# Patient Record
Sex: MALE | Race: WHITE | Employment: FULL TIME | URBAN - METROPOLITAN AREA
[De-identification: names, ages, dates, MRNs, and addresses within clinical notes are randomized per-mention and may not be internally consistent; named-entity substitution may affect disease eponyms.]

---

## 2022-05-26 NOTE — PROGRESS NOTES
[facility]   Cardiac Consultation    Referring Provider:  No primary care provider on file. No chief complaint on file. History of Present Illness:   Arabella Mendoza is here as a new patient. He was referred by MARISA Booth for left bundle block branch. He also needs cardiac clearance for his life insurance policy. Today ***    Past Medical History:   has no past medical history on file. Surgical History:   has no past surgical history on file. Social History:        Family History:  family history is not on file. Home Medications:  Prior to Admission medications    Not on File        Allergies:  Patient has no allergy information on record. Review of Systems:   · Constitutional: there has been no unanticipated weight loss. There's been no change in energy level, sleep pattern, or activity level. · Eyes: No visual changes or diplopia. No scleral icterus. · ENT: No Headaches, hearing loss or vertigo. No mouth sores or sore throat. · Cardiovascular: Reviewed in HPI  · Respiratory: No cough or wheezing, no sputum production. No hematemesis. · Gastrointestinal: No abdominal pain, appetite loss, blood in stools. No change in bowel or bladder habits. · Genitourinary: No dysuria, trouble voiding, or hematuria. · Musculoskeletal:  No gait disturbance, weakness or joint complaints. · Integumentary: No rash or pruritis. · Neurological: No headache, diplopia, change in muscle strength, numbness or tingling. No change in gait, balance, coordination, mood, affect, memory, mentation, behavior. · Psychiatric: No anxiety, no depression. · Endocrine: No malaise, fatigue or temperature intolerance. No excessive thirst, fluid intake, or urination. No tremor. · Hematologic/Lymphatic: No abnormal bruising or bleeding, blood clots or swollen lymph nodes. · Allergic/Immunologic: No nasal congestion or hives.     Physical Examination:    There were no vitals filed for this visit. Constitutional and General Appearance: NAD   Respiratory:  · Normal excursion and expansion without use of accessory muscles  · Resp Auscultation: Normal breath sounds without dullness  Cardiovascular:  · The apical impulses not displaced  · Heart tones are crisp and normal  · Cervical veins are not engorged  · The carotid upstroke is normal in amplitude and contour without delay or bruit  · Normal S1S2, No S3, No Murmur  · Peripheral pulses are symmetrical and full  · There is no clubbing, cyanosis of the extremities. · No edema  · Femoral Arteries: 2+ and equal  · Pedal Pulses: 2+ and equal   Abdomen:  · No masses or tenderness  · Liver/Spleen: No Abnormalities Noted  Neurological/Psychiatric:  · Alert and oriented in all spheres  · Moves all extremities well  · Exhibits normal gait balance and coordination  · No abnormalities of mood, affect, memory, mentation, or behavior are noted      Assessment:     No diagnosis found. Plan:  ***  Cardiac medications reviewed including indications and pertinent side effects. Medication list updated at this visit. Check blood pressure and heart rate at home a few times per week- keep a log with dates and times and bring to office visit   Regular exercise and following a healthy diet encouraged   Follow up with me ***      Thank you for allowing me to participate in the care of this individual.      Adriana Reyes.  Liz Crystal M.D., Alejandro Lockhart

## 2022-05-26 NOTE — PROGRESS NOTES
[address]   Cardiac Consultation    Referring Provider:  No primary care provider on file. Chief Complaint   Patient presents with    New Patient     reffered for abnormal EKG       Tim Olmedo   1980     History of Present Illness:   Tim Olmedo is a 38 y.o. male who is here today as a NEW patient consult for cardiology, referred by referred by MARISA Fay for an abnormal EKG showing left bundle block branch. He also is here for cardiac assessment for his life insurance policy. Today he states he is a former smoker, quit 20 years ago. He has a family story of heart diease in his father but he is not sure of any details. He states he was having an EKG due to updating his insurance policy. No prior EKG's. He states he has mary feeling well, no chest pain or SOB. He stays active walking at work and also at home. He works for [employer] and walks often at work. He states he is able to walk  > one mile and up > flight stairs with no symptoms or functional limitations. He is busy caring for his 3year old. Patient currently denies any weight gain, edema, palpitations, chest pain, shortness of breath, dizziness, and syncope. Past Medical History:   has no past medical history on file. Surgical History:   has no past surgical history on file. Social History:   reports that he quit smoking about 22 years ago. He has never used smokeless tobacco. He reports current alcohol use. He reports that he does not use drugs. Family History:  Family history of heart diease      Home Medications:  Prior to Admission medications    Medication Sig Start Date End Date Taking? Authorizing Provider   MULTIPLE VITAMIN PO Take by mouth daily   Yes Historical Provider, MD   Cetirizine HCl (ZYRTEC ALLERGY PO) Take by mouth Pt takes when needed   Yes Historical Provider, MD        Allergies:  Patient has no known allergies.      Review of Systems:   · Constitutional: there has been no unanticipated weight loss. There's been no change in energy level, sleep pattern, or activity level. · Eyes: No visual changes or diplopia. No scleral icterus. · ENT: No Headaches, hearing loss or vertigo. No mouth sores or sore throat. · Cardiovascular: Reviewed in HPI  · Respiratory: No cough or wheezing, no sputum production. No hematemesis. · Gastrointestinal: No abdominal pain, appetite loss, blood in stools. No change in bowel or bladder habits. · Genitourinary: No dysuria, trouble voiding, or hematuria. · Musculoskeletal:  No gait disturbance, weakness or joint complaints. · Integumentary: No rash or pruritis. · Neurological: No headache, diplopia, change in muscle strength, numbness or tingling. No change in gait, balance, coordination, mood, affect, memory, mentation, behavior. · Psychiatric: No anxiety, no depression. · Endocrine: No malaise, fatigue or temperature intolerance. No excessive thirst, fluid intake, or urination. No tremor. · Hematologic/Lymphatic: No abnormal bruising or bleeding, blood clots or swollen lymph nodes. · Allergic/Immunologic: No nasal congestion or hives. Physical Examination:    Vitals:    05/31/22 0838   BP: 102/74   Pulse: 87   SpO2: 97%        Constitutional and General Appearance: NAD   Respiratory:  · Normal excursion and expansion without use of accessory muscles  · Resp Auscultation: Normal breath sounds without dullness  Cardiovascular:  · The apical impulses not displaced  · Heart tones are crisp and normal  · Cervical veins are not engorged  · The carotid upstroke is normal in amplitude and contour without delay or bruit  · Normal S1S2, No S3, No Murmur  · Peripheral pulses are symmetrical and full  · There is no clubbing, cyanosis of the extremities.   · No edema  · Femoral Arteries: 2+ and equal  · Pedal Pulses: 2+ and equal   Abdomen:  · No masses or tenderness  · Liver/Spleen: No Abnormalities Noted  Neurological/Psychiatric:  · Alert and oriented in all spheres  · Moves all extremities well  · Exhibits normal gait balance and coordination  · No abnormalities of mood, affect, memory, mentation, or behavior are noted      Assessment:   Abnormal EKG  Left BBB - asymptomatic. No prior EKG  Family history of heart diease - unknown details   Former smoker - continued cessation advised   Cardiac risk assessment for insurance policy     Plan:  Echocardiogram    Discussed a CT calcium score and stress test - patiet would like to will hold of for now, pending echo results. As he is asymptomatic, this is reasonable. Find out what your family history of heart diease is   Cardiac medications discussed including indications and pertinent side effects. Will defer at this time. Check blood pressure and heart rate at home a few times per week- keep a log with dates and times and bring to office visit   Regular exercise and following a healthy diet encouraged   Follow up with me in one month     Scribe's attestation: This note was scribed in the presence of Dr. Ivana Badillo M.D. By Rashida Soriano RN    The scribes documentation has been prepared under my direction and personally reviewed by me in its entirety. I confirm that the note above accurately reflects all work, treatment, procedures, and medical decision making performed by me. Dr. Ivana Badillo MD    Thank you for allowing me to participate in the care of this individual.      Luz Maria Ricardo.  Jyothi Willard M.D., Suzan Lim

## 2022-05-31 ENCOUNTER — OFFICE VISIT (OUTPATIENT)
Dept: CARDIOLOGY CLINIC | Age: 42
End: 2022-05-31
Payer: COMMERCIAL

## 2022-05-31 VITALS
DIASTOLIC BLOOD PRESSURE: 74 MMHG | BODY MASS INDEX: 25.66 KG/M2 | HEIGHT: 67 IN | OXYGEN SATURATION: 97 % | WEIGHT: 163.5 LBS | SYSTOLIC BLOOD PRESSURE: 102 MMHG | HEART RATE: 87 BPM

## 2022-05-31 DIAGNOSIS — I44.7 LEFT BUNDLE BRANCH BLOCK: Primary | ICD-10-CM

## 2022-05-31 DIAGNOSIS — R94.31 ABNORMAL EKG: ICD-10-CM

## 2022-05-31 PROCEDURE — G8427 DOCREV CUR MEDS BY ELIG CLIN: HCPCS | Performed by: INTERNAL MEDICINE

## 2022-05-31 PROCEDURE — 99203 OFFICE O/P NEW LOW 30 MIN: CPT | Performed by: INTERNAL MEDICINE

## 2022-05-31 PROCEDURE — G8419 CALC BMI OUT NRM PARAM NOF/U: HCPCS | Performed by: INTERNAL MEDICINE

## 2022-05-31 NOTE — PATIENT INSTRUCTIONS
Plan:  Recommend an echocardiogram which is an ultrasound of your heart to evaluate heart function, structures and valves. Discussed a CT calcium score and stress test- will hold of for now   Find out what your family history of heart diease is   Cardiac medications reviewed including indications and pertinent side effects. Medication list updated at this visit. Check blood pressure and heart rate at home a few times per week- keep a log with dates and times and bring to office visit   Regular exercise and following a healthy diet encouraged   Follow up with me in one month   Your provider has ordered testing for further evaluation. An order/prescription has been included in your paper work.  To schedule outpatient testing, contact Central Scheduling by calling [address] [place], [place] [address] ([phone]).

## 2022-06-08 ENCOUNTER — TELEPHONE (OUTPATIENT)
Dept: CARDIOLOGY CLINIC | Age: 42
End: 2022-06-08

## 2022-06-08 ENCOUNTER — HOSPITAL ENCOUNTER (OUTPATIENT)
Dept: CARDIOLOGY | Age: 42
Discharge: HOME | End: 2022-06-08
Payer: COMMERCIAL

## 2022-06-08 DIAGNOSIS — R94.31 ABNORMAL EKG: ICD-10-CM

## 2022-06-08 DIAGNOSIS — I44.7 LEFT BUNDLE BRANCH BLOCK: ICD-10-CM

## 2022-06-08 LAB
LV EF: 53 %
LVEF MODALITY: NORMAL

## 2022-06-08 PROCEDURE — 93306 TTE W/DOPPLER COMPLETE: CPT

## 2022-06-08 NOTE — TELEPHONE ENCOUNTER
Called and spoke with pt. Relayed echo results to him. Pt verbalized understanding of results given.

## 2022-06-08 NOTE — TELEPHONE ENCOUNTER
----- Message from Dayo Moe MD sent at 6/8/2022  1:22 PM EDT -----  Call  Echo looks good  Heart fxn normal

## 2022-06-24 NOTE — PROGRESS NOTES
[address]   Cardiac Consultation    Referring Provider:  Fabi David     Chief Complaint   Patient presents with    Follow-up    Other     LBBB      Tonya Wallis   1980     History of Present Illness:   Tonya Wallis is a 38 y.o. male who is here today for follow up and to review testing. He was initially seen as a new patient at the request of Fabi David for an abnormal EKG showing left bundle block branch. He also is here for cardiac assessment for his life insurance policy. He is a former smoker- quit 20 years ago. He states he is able to walk  > one mile and up > flight stairs with no symptoms or functional limitations. An echocardiogram from 6/8/2022 showed an EF of 50-55%. Father and grandfather have a fib. Today he states he has been feeling well since his last visit. He remains active without symptoms or functional limitations. Patient currently denies any weight gain, edema, palpitations, chest pain, shortness of breath, dizziness, and syncope. Past Medical History:   has no past medical history on file. Surgical History:   has no past surgical history on file. Social History:   reports that he quit smoking about 22 years ago. He has never used smokeless tobacco. He reports current alcohol use. He reports that he does not use drugs. Family History:  Family history of heart diease      Home Medications:  Prior to Admission medications    Medication Sig Start Date End Date Taking? Authorizing Provider   MULTIPLE VITAMIN PO Take by mouth daily   Yes Historical Provider, MD   Cetirizine HCl (ZYRTEC ALLERGY PO) Take by mouth Pt takes when needed   Yes Historical Provider, MD        Allergies:  Patient has no known allergies. Review of Systems:   · Constitutional: there has been no unanticipated weight loss. There's been no change in energy level, sleep pattern, or activity level. · Eyes: No visual changes or diplopia. No scleral icterus.   · ENT: No Headaches, hearing loss or vertigo. No mouth sores or sore throat. · Cardiovascular: Reviewed in HPI  · Respiratory: No cough or wheezing, no sputum production. No hematemesis. · Gastrointestinal: No abdominal pain, appetite loss, blood in stools. No change in bowel or bladder habits. · Genitourinary: No dysuria, trouble voiding, or hematuria. · Musculoskeletal:  No gait disturbance, weakness or joint complaints. · Integumentary: No rash or pruritis. · Neurological: No headache, diplopia, change in muscle strength, numbness or tingling. No change in gait, balance, coordination, mood, affect, memory, mentation, behavior. · Psychiatric: No anxiety, no depression. · Endocrine: No malaise, fatigue or temperature intolerance. No excessive thirst, fluid intake, or urination. No tremor. · Hematologic/Lymphatic: No abnormal bruising or bleeding, blood clots or swollen lymph nodes. · Allergic/Immunologic: No nasal congestion or hives. Physical Examination:    Vitals:    06/27/22 0844   BP: 106/74   Pulse: 96   SpO2: 98%        Constitutional and General Appearance: NAD   Respiratory:  · Normal excursion and expansion without use of accessory muscles  · Resp Auscultation: Normal breath sounds without dullness  Cardiovascular:  · The apical impulses not displaced  · Heart tones are crisp and normal  · Cervical veins are not engorged  · The carotid upstroke is normal in amplitude and contour without delay or bruit  · Normal S1S2, No S3, No Murmur  · Peripheral pulses are symmetrical and full  · There is no clubbing, cyanosis of the extremities.   · No edema  · Femoral Arteries: 2+ and equal  · Pedal Pulses: 2+ and equal   Abdomen:  · No masses or tenderness  · Liver/Spleen: No Abnormalities Noted  Neurological/Psychiatric:  · Alert and oriented in all spheres  · Moves all extremities well  · Exhibits normal gait balance and coordination  · No abnormalities of mood, affect, memory, mentation, or behavior are noted      Echocardiogram 6/8/2022   Summary   -- Low normal systolic function with an estimated ejection fraction of   50-55%. Abnormal (paradoxical) septal motion is present likely due to left   bundle branch block. No gross regional wall motion abnormalities are seen. Normal left ventricular diastolic filling pressure. -- MIld pulmonic regurgitation. Inadequate tricuspid regurgitation to estimate systolic pulmonary artery   pressure. Assessment:   Abnormal EKG - reviewed w/ pt  Left BBB - Asymptomatic. Stable. Low risk. No further cardiac testing at this time. No family history of CAD or sudden cardiac death  Former smoker - continued cessation advised   MIld pulmonic regurgitation - asymptomatic. stable    Plan:  Follow up with Rowan Sebastien on a regular basis   Check blood pressure and heart rate at home a few times per week- keep a log with dates and times and bring to office visit   Regular exercise and following a healthy diet encouraged   Follow up with me as needed     Scribe's attestation: This note was scribed in the presence of Dr. Moisés Horton M.D. By Cleone Bamberger RN    The scribes documentation has been prepared under my direction and personally reviewed by me in its entirety. I confirm that the note above accurately reflects all work, treatment, procedures, and medical decision making performed by me. Dr. Moisés Horton MD    Thank you for allowing me to participate in the care of this individual.      Charles Nicolas.  Anastasia Bray M.D., Sagar Putnam

## 2022-06-27 ENCOUNTER — OFFICE VISIT (OUTPATIENT)
Dept: CARDIOLOGY CLINIC | Age: 42
End: 2022-06-27
Payer: COMMERCIAL

## 2022-06-27 VITALS
OXYGEN SATURATION: 98 % | WEIGHT: 165 LBS | HEIGHT: 67 IN | BODY MASS INDEX: 25.9 KG/M2 | SYSTOLIC BLOOD PRESSURE: 106 MMHG | DIASTOLIC BLOOD PRESSURE: 74 MMHG | HEART RATE: 96 BPM

## 2022-06-27 DIAGNOSIS — I44.7 LBBB (LEFT BUNDLE BRANCH BLOCK): ICD-10-CM

## 2022-06-27 DIAGNOSIS — R94.31 ABNORMAL EKG: Primary | ICD-10-CM

## 2022-06-27 PROCEDURE — G8419 CALC BMI OUT NRM PARAM NOF/U: HCPCS | Performed by: INTERNAL MEDICINE

## 2022-06-27 PROCEDURE — 1036F TOBACCO NON-USER: CPT | Performed by: INTERNAL MEDICINE

## 2022-06-27 PROCEDURE — 99214 OFFICE O/P EST MOD 30 MIN: CPT | Performed by: INTERNAL MEDICINE

## 2022-06-27 PROCEDURE — G8427 DOCREV CUR MEDS BY ELIG CLIN: HCPCS | Performed by: INTERNAL MEDICINE

## 2022-06-27 NOTE — PATIENT INSTRUCTIONS
Plan:  Follow up with Belgica Horta on a regular basis   Check blood pressure and heart rate at home a few times per week- keep a log with dates and times and bring to office visit   Regular exercise and following a healthy diet encouraged   Follow up with me as needed

## 2024-11-10 ENCOUNTER — HOSPITAL ENCOUNTER (EMERGENCY)
Age: 44
Discharge: HOME | End: 2024-11-10
Attending: EMERGENCY MEDICINE
Payer: COMMERCIAL

## 2024-11-10 ENCOUNTER — APPOINTMENT (OUTPATIENT)
Dept: GENERAL RADIOLOGY | Age: 44
End: 2024-11-10
Payer: COMMERCIAL

## 2024-11-10 VITALS
BODY MASS INDEX: 28.25 KG/M2 | HEART RATE: 91 BPM | HEIGHT: 67 IN | DIASTOLIC BLOOD PRESSURE: 74 MMHG | OXYGEN SATURATION: 96 % | WEIGHT: 180 LBS | TEMPERATURE: 98.6 F | SYSTOLIC BLOOD PRESSURE: 135 MMHG | RESPIRATION RATE: 16 BRPM

## 2024-11-10 DIAGNOSIS — S43.004A DISLOCATION OF RIGHT SHOULDER JOINT, INITIAL ENCOUNTER: Primary | ICD-10-CM

## 2024-11-10 PROCEDURE — 23650 CLTX SHO DSLC W/MNPJ WO ANES: CPT

## 2024-11-10 PROCEDURE — 73030 X-RAY EXAM OF SHOULDER: CPT

## 2024-11-10 PROCEDURE — 99283 EMERGENCY DEPT VISIT LOW MDM: CPT

## 2024-11-10 RX ORDER — CYCLOBENZAPRINE HCL 5 MG
5 TABLET ORAL 3 TIMES DAILY PRN
Qty: 30 TABLET | Refills: 0 | Status: SHIPPED | OUTPATIENT
Start: 2024-11-10 | End: 2024-11-20

## 2024-11-10 RX ORDER — IBUPROFEN 800 MG/1
800 TABLET, FILM COATED ORAL EVERY 8 HOURS PRN
Qty: 30 TABLET | Refills: 0 | Status: SHIPPED | OUTPATIENT
Start: 2024-11-10

## 2024-11-10 ASSESSMENT — PAIN SCALES - GENERAL: PAINLEVEL_OUTOF10: 7

## 2024-11-10 ASSESSMENT — PAIN - FUNCTIONAL ASSESSMENT: PAIN_FUNCTIONAL_ASSESSMENT: 0-10

## 2024-11-11 ENCOUNTER — TELEPHONE (OUTPATIENT)
Dept: ORTHOPEDIC SURGERY | Age: 44
End: 2024-11-11

## 2024-11-11 SDOH — HEALTH STABILITY: PHYSICAL HEALTH: ON AVERAGE, HOW MANY MINUTES DO YOU ENGAGE IN EXERCISE AT THIS LEVEL?: 120 MIN

## 2024-11-11 SDOH — HEALTH STABILITY: PHYSICAL HEALTH: ON AVERAGE, HOW MANY DAYS PER WEEK DO YOU ENGAGE IN MODERATE TO STRENUOUS EXERCISE (LIKE A BRISK WALK)?: 5 DAYS

## 2024-11-11 NOTE — ED PROVIDER NOTES
EMERGENCY DEPARTMENT ENCOUNTER     [facility]  ED     Pt Name: Sebastian Zambrano   MRN: [id number]   Birthdate 1980   Date of evaluation: 11/10/2024   Provider: Farooq Linda MD   PCP: Donna Batista APRN - NP   Note Started: 10:33 PM EST 11/10/24     CHIEF COMPLAINT     Chief Complaint   Patient presents with    Fall     Slipped and fell down 7 steps at 8pm. C/o R shoulder pain. Noted deformity. No OTC meds taken        HISTORY OF PRESENT ILLNESS:  History from : Patient   Limitations to history : None     Sebastian Zambrano is a 44 y.o. male who presents for shoulder pain.  Patient reports he slipped and fell on the stairs injuring his right shoulder.  No other pain complaints.    Nursing Notes were all reviewed and agreed with or any disagreements were addressed in the HPI.     ROS: Positives and Pertinent negatives as per HPI.    PAST MEDICAL HISTORY     Past medical history:  has no past medical history on file.    Past surgical history:  has no past surgical history on file.    Med list:   No current facility-administered medications on file prior to encounter.     Current Outpatient Medications on File Prior to Encounter   Medication Sig Dispense Refill    MULTIPLE VITAMIN PO Take by mouth daily (Patient not taking: Reported on 11/10/2024)      Cetirizine HCl (ZYRTEC ALLERGY PO) Take by mouth Pt takes when needed (Patient not taking: Reported on 11/10/2024)         PHYSICAL EXAM:  ED Triage Vitals [11/10/24 2031]   BP Systolic BP Percentile Diastolic BP Percentile Temp Temp src Pulse Respirations SpO2   135/74 -- -- 98.6 °F (37 °C) -- 91 16 96 %      Height Weight - Scale         1.702 m (5' 7\") 81.6 kg (180 lb)              Physical Exam   Right shoulder is held abducted from the body with a sulcus noted laterally.  Neurovascular intact to the fingertips of the right hand.  2+ radial pulse.  Patient in moderate distress due to pain.    DIAGNOSTIC RESULTS   LABS:   Labs Reviewed -

## 2024-11-11 NOTE — ED NOTES
Ok for d/c. Stable and ambulatory w/ sling in place. Edu pt and family at bedside re:f/u and new home rx. No questions at this time. Left w/ family and all belongings

## 2024-11-11 NOTE — TELEPHONE ENCOUNTER
General Question     Subject: ER REFERRAL  Patient and /or Facility Request: Sebastian Zambrano   Contact Number: [phone]     THIS PT WAS REFERRED TO DR PATINO BY THE ER FOR A SHOULDER DISLOCATION.    PLEASE CALL THE PT BACK AT THE ABOVE # BECAUSE WE WEREN'T SURE IF DR PATINO WOULD SEE A DISLOCATION.

## 2024-11-11 NOTE — TELEPHONE ENCOUNTER
General Question     Subject: ER REFERRAL  Patient and /or Facility Request: Sebastian Zambrano   Contact Number: [phone]     THIS PT WAS REFERRED TO DR PATINO BY THE ER FOR A SHOULDER DISLOCATION.    PLEASE CALL THE PT BACK AT THE ABOVE # BECAUSE WE WEREN'T SURE IF DR PATINO WOULD SEE A DISLOCATION.

## 2024-11-12 ENCOUNTER — TELEPHONE (OUTPATIENT)
Dept: ORTHOPEDIC SURGERY | Age: 44
End: 2024-11-12

## 2024-11-12 ENCOUNTER — OFFICE VISIT (OUTPATIENT)
Dept: ORTHOPEDIC SURGERY | Age: 44
End: 2024-11-12
Payer: COMMERCIAL

## 2024-11-12 VITALS — BODY MASS INDEX: 28.25 KG/M2 | WEIGHT: 180 LBS | HEIGHT: 67 IN

## 2024-11-12 DIAGNOSIS — M25.511 RIGHT SHOULDER PAIN, UNSPECIFIED CHRONICITY: Primary | ICD-10-CM

## 2024-11-12 PROCEDURE — 99204 OFFICE O/P NEW MOD 45 MIN: CPT | Performed by: PHYSICIAN ASSISTANT

## 2024-11-12 PROCEDURE — G8427 DOCREV CUR MEDS BY ELIG CLIN: HCPCS | Performed by: PHYSICIAN ASSISTANT

## 2024-11-12 PROCEDURE — G8419 CALC BMI OUT NRM PARAM NOF/U: HCPCS | Performed by: PHYSICIAN ASSISTANT

## 2024-11-12 PROCEDURE — 1036F TOBACCO NON-USER: CPT | Performed by: PHYSICIAN ASSISTANT

## 2024-11-12 PROCEDURE — G8484 FLU IMMUNIZE NO ADMIN: HCPCS | Performed by: PHYSICIAN ASSISTANT

## 2024-11-12 NOTE — PROGRESS NOTES
[]Not tested   []  []Not tested    Hyperabduction  []  []Not tested   []  []Not tested    Sulcus []Side   []ER    []Side   []ER       Anterior apprehension  [x]  []Not tested   []  []Not tested    Relocation  []   []Not tested  []  []Not tested     Imaging  X-rays are examined of the emergency room.  3 views of the right shoulder.  They demonstrate no evidence of fractures or dislocations with well reduced shoulder.  These are taken on November 10, 2024.    X-rays were taken today of the right shoulder.  3 views.  AP, scapular Y, and axillary views.  They demonstrate Hill-Sachs lesion.  Well reduced joint.  Procedure:  Orders Placed This Encounter   Procedures    XR SHOULDER RIGHT (MIN 2 VIEWS)     Standing Status:   Future     Number of Occurrences:   1     Standing Expiration Date:   11/11/2025         Assessment and Plan  Sebastian was seen today for shoulder pain.    Diagnoses and all orders for this visit:    Right shoulder pain, unspecified chronicity  -     XR SHOULDER RIGHT (MIN 2 VIEWS); Future        Patient did have an acute shoulder dislocation at least by history and with evidence of Hill-Sachs lesion on x-rays.  I have ordered the patient an MRI.  I am suspicious for labral tear based on clinical exam and history.  No neurologic symptoms.  Follow-up with Dr. Schaefer after MRI to review results.  He will work on hand wrist and elbow range of motion exercises.    I discussed with Sebastian Zambrano that his history, symptoms, signs, and imaging are most consistent with shoulder dislocation.    We reviewed the natural history of these conditions and treatment options ranging from conservative measures (rest, icing, activity modification, physical therapy, pain meds, cortisone injection) to surgical options.     Based on his symptoms I recommended the following imaging studies: MRI. Script was provided.    After imaging is completed, patient will make a follow up appointment to review imaging.    In terms of

## 2024-11-12 NOTE — TELEPHONE ENCOUNTER
General Question     Subject: PLEASE CALL TO LET Pt KNOW IF MRI IS AUTHORIZED   Patient and /or Facility Request: Sebastian Zambrano   Contact Number: [phone]

## 2024-11-13 ENCOUNTER — TELEPHONE (OUTPATIENT)
Dept: ORTHOPEDIC SURGERY | Age: 44
End: 2024-11-13

## 2024-11-13 DIAGNOSIS — M25.511 RIGHT SHOULDER PAIN, UNSPECIFIED CHRONICITY: Primary | ICD-10-CM

## 2024-11-13 NOTE — TELEPHONE ENCOUNTER
No Pre-Cert Required for MRI- Rt. Shoulder   Patients Ins is [facility]   Patients insurance policy does not require a Pre Auth

## 2024-11-13 NOTE — TELEPHONE ENCOUNTER
General Question     Subject: CHECKING ON HIS STATUS FOR A MRI.  Patient and /or Facility Request: Sebastian Zambrano   Contact Number: [phone]

## 2024-11-13 NOTE — TELEPHONE ENCOUNTER
Patient requested to move MRI to [facility]      New Order sent/ Awaiting authorization